# Patient Record
Sex: MALE | Race: WHITE | NOT HISPANIC OR LATINO | Employment: OTHER | URBAN - METROPOLITAN AREA
[De-identification: names, ages, dates, MRNs, and addresses within clinical notes are randomized per-mention and may not be internally consistent; named-entity substitution may affect disease eponyms.]

---

## 2024-05-09 ENCOUNTER — APPOINTMENT (OUTPATIENT)
Dept: CT IMAGING | Facility: CLINIC | Age: 54
End: 2024-05-09
Attending: STUDENT IN AN ORGANIZED HEALTH CARE EDUCATION/TRAINING PROGRAM
Payer: COMMERCIAL

## 2024-05-09 ENCOUNTER — HOSPITAL ENCOUNTER (INPATIENT)
Facility: CLINIC | Age: 54
LOS: 1 days | Discharge: HOME | End: 2024-05-10
Attending: STUDENT IN AN ORGANIZED HEALTH CARE EDUCATION/TRAINING PROGRAM | Admitting: STUDENT IN AN ORGANIZED HEALTH CARE EDUCATION/TRAINING PROGRAM
Payer: COMMERCIAL

## 2024-05-09 DIAGNOSIS — L03.311 CELLULITIS OF ABDOMINAL WALL: Primary | ICD-10-CM

## 2024-05-09 PROBLEM — R10.9 ABDOMINAL PAIN: Status: ACTIVE | Noted: 2017-01-25

## 2024-05-09 PROBLEM — Z85.51 HISTORY OF BLADDER CANCER: Status: ACTIVE | Noted: 2017-02-28

## 2024-05-09 PROBLEM — E78.5 DYSLIPIDEMIA ASSOCIATED WITH TYPE 2 DIABETES MELLITUS (H): Status: ACTIVE | Noted: 2020-09-29

## 2024-05-09 PROBLEM — K31.84 GASTROPARESIS DUE TO DM (H): Status: ACTIVE | Noted: 2023-06-22

## 2024-05-09 PROBLEM — C67.9 MALIGNANT NEOPLASM OF URINARY BLADDER (H): Status: ACTIVE | Noted: 2017-03-16

## 2024-05-09 PROBLEM — E11.43 GASTROPARESIS DUE TO DM (H): Status: ACTIVE | Noted: 2023-06-22

## 2024-05-09 PROBLEM — R31.9 HEMATURIA: Status: ACTIVE | Noted: 2017-01-02

## 2024-05-09 PROBLEM — D49.4 BLADDER TUMOR: Status: ACTIVE | Noted: 2017-04-25

## 2024-05-09 PROBLEM — C67.2 MALIGNANT NEOPLASM OF LATERAL WALL OF URINARY BLADDER (H): Status: ACTIVE | Noted: 2017-05-12

## 2024-05-09 PROBLEM — E11.69 DYSLIPIDEMIA ASSOCIATED WITH TYPE 2 DIABETES MELLITUS (H): Status: ACTIVE | Noted: 2020-09-29

## 2024-05-09 PROBLEM — Z79.4 TYPE 2 DIABETES MELLITUS WITH HYPERGLYCEMIA, WITH LONG-TERM CURRENT USE OF INSULIN (H): Status: ACTIVE | Noted: 2020-09-29

## 2024-05-09 PROBLEM — E11.42 DIABETIC PERIPHERAL NEUROPATHY (H): Status: ACTIVE | Noted: 2020-09-29

## 2024-05-09 PROBLEM — E11.65 TYPE 2 DIABETES MELLITUS WITH HYPERGLYCEMIA, WITH LONG-TERM CURRENT USE OF INSULIN (H): Status: ACTIVE | Noted: 2020-09-29

## 2024-05-09 PROBLEM — I15.2 HYPERTENSION ASSOCIATED WITH DIABETES (H): Status: ACTIVE | Noted: 2020-09-29

## 2024-05-09 PROBLEM — N30.00 ACUTE CYSTITIS: Status: ACTIVE | Noted: 2017-05-12

## 2024-05-09 PROBLEM — E11.59 HYPERTENSION ASSOCIATED WITH DIABETES (H): Status: ACTIVE | Noted: 2020-09-29

## 2024-05-09 LAB
ALBUMIN SERPL BCG-MCNC: 4.2 G/DL (ref 3.5–5.2)
ALP SERPL-CCNC: 124 U/L (ref 40–150)
ALT SERPL W P-5'-P-CCNC: 18 U/L (ref 0–70)
ANION GAP SERPL CALCULATED.3IONS-SCNC: 10 MMOL/L (ref 7–15)
AST SERPL W P-5'-P-CCNC: 18 U/L (ref 0–45)
BASOPHILS # BLD AUTO: 0.1 10E3/UL (ref 0–0.2)
BASOPHILS NFR BLD AUTO: 1 %
BILIRUB DIRECT SERPL-MCNC: <0.2 MG/DL (ref 0–0.3)
BILIRUB SERPL-MCNC: <0.2 MG/DL
BUN SERPL-MCNC: 18.6 MG/DL (ref 6–20)
CALCIUM SERPL-MCNC: 9.5 MG/DL (ref 8.6–10)
CHLORIDE SERPL-SCNC: 98 MMOL/L (ref 98–107)
CREAT SERPL-MCNC: 0.54 MG/DL (ref 0.67–1.17)
CRP SERPL-MCNC: 15.8 MG/L
DEPRECATED HCO3 PLAS-SCNC: 27 MMOL/L (ref 22–29)
EGFRCR SERPLBLD CKD-EPI 2021: >90 ML/MIN/1.73M2
EOSINOPHIL # BLD AUTO: 0.2 10E3/UL (ref 0–0.7)
EOSINOPHIL NFR BLD AUTO: 2 %
ERYTHROCYTE [DISTWIDTH] IN BLOOD BY AUTOMATED COUNT: 13.9 % (ref 10–15)
GLUCOSE SERPL-MCNC: 449 MG/DL (ref 70–99)
HCT VFR BLD AUTO: 42.5 % (ref 40–53)
HGB BLD-MCNC: 14 G/DL (ref 13.3–17.7)
IMM GRANULOCYTES # BLD: 0 10E3/UL
IMM GRANULOCYTES NFR BLD: 1 %
LIPASE SERPL-CCNC: 33 U/L (ref 13–60)
LYMPHOCYTES # BLD AUTO: 2 10E3/UL (ref 0.8–5.3)
LYMPHOCYTES NFR BLD AUTO: 23 %
MAGNESIUM SERPL-MCNC: 2 MG/DL (ref 1.7–2.3)
MCH RBC QN AUTO: 29.4 PG (ref 26.5–33)
MCHC RBC AUTO-ENTMCNC: 32.9 G/DL (ref 31.5–36.5)
MCV RBC AUTO: 89 FL (ref 78–100)
MONOCYTES # BLD AUTO: 0.9 10E3/UL (ref 0–1.3)
MONOCYTES NFR BLD AUTO: 10 %
NEUTROPHILS # BLD AUTO: 5.4 10E3/UL (ref 1.6–8.3)
NEUTROPHILS NFR BLD AUTO: 63 %
NRBC # BLD AUTO: 0 10E3/UL
NRBC BLD AUTO-RTO: 0 /100
PLATELET # BLD AUTO: 310 10E3/UL (ref 150–450)
POTASSIUM SERPL-SCNC: 4.1 MMOL/L (ref 3.4–5.3)
PROCALCITONIN SERPL IA-MCNC: 0.05 NG/ML
PROT SERPL-MCNC: 6.8 G/DL (ref 6.4–8.3)
RBC # BLD AUTO: 4.77 10E6/UL (ref 4.4–5.9)
SODIUM SERPL-SCNC: 135 MMOL/L (ref 135–145)
WBC # BLD AUTO: 8.6 10E3/UL (ref 4–11)

## 2024-05-09 PROCEDURE — 96365 THER/PROPH/DIAG IV INF INIT: CPT

## 2024-05-09 PROCEDURE — 86140 C-REACTIVE PROTEIN: CPT | Performed by: EMERGENCY MEDICINE

## 2024-05-09 PROCEDURE — 83735 ASSAY OF MAGNESIUM: CPT | Performed by: EMERGENCY MEDICINE

## 2024-05-09 PROCEDURE — 96366 THER/PROPH/DIAG IV INF ADDON: CPT

## 2024-05-09 PROCEDURE — 85025 COMPLETE CBC W/AUTO DIFF WBC: CPT | Performed by: EMERGENCY MEDICINE

## 2024-05-09 PROCEDURE — 87040 BLOOD CULTURE FOR BACTERIA: CPT | Performed by: EMERGENCY MEDICINE

## 2024-05-09 PROCEDURE — 99285 EMERGENCY DEPT VISIT HI MDM: CPT | Mod: 25

## 2024-05-09 PROCEDURE — 83690 ASSAY OF LIPASE: CPT | Performed by: EMERGENCY MEDICINE

## 2024-05-09 PROCEDURE — 74177 CT ABD & PELVIS W/CONTRAST: CPT

## 2024-05-09 PROCEDURE — 84145 PROCALCITONIN (PCT): CPT | Performed by: EMERGENCY MEDICINE

## 2024-05-09 PROCEDURE — 250N000011 HC RX IP 250 OP 636: Performed by: STUDENT IN AN ORGANIZED HEALTH CARE EDUCATION/TRAINING PROGRAM

## 2024-05-09 PROCEDURE — 80053 COMPREHEN METABOLIC PANEL: CPT | Performed by: EMERGENCY MEDICINE

## 2024-05-09 PROCEDURE — 36415 COLL VENOUS BLD VENIPUNCTURE: CPT | Performed by: EMERGENCY MEDICINE

## 2024-05-09 PROCEDURE — 258N000003 HC RX IP 258 OP 636: Performed by: STUDENT IN AN ORGANIZED HEALTH CARE EDUCATION/TRAINING PROGRAM

## 2024-05-09 RX ORDER — IOPAMIDOL 755 MG/ML
90 INJECTION, SOLUTION INTRAVASCULAR ONCE
Status: COMPLETED | OUTPATIENT
Start: 2024-05-10 | End: 2024-05-10

## 2024-05-09 RX ORDER — PERPHENAZINE 16 MG
TABLET ORAL
COMMUNITY

## 2024-05-09 RX ORDER — CARBAMAZEPINE 200 MG/1
400 CAPSULE, EXTENDED RELEASE ORAL 2 TIMES DAILY
COMMUNITY
Start: 2023-12-26

## 2024-05-09 RX ORDER — INSULIN ASPART 100 [IU]/ML
INJECTION, SOLUTION INTRAVENOUS; SUBCUTANEOUS
COMMUNITY
Start: 2023-12-29

## 2024-05-09 RX ADMIN — SODIUM CHLORIDE 1000 ML: 9 INJECTION, SOLUTION INTRAVENOUS at 23:27

## 2024-05-09 RX ADMIN — VANCOMYCIN HYDROCHLORIDE 1500 MG: 5 INJECTION, POWDER, LYOPHILIZED, FOR SOLUTION INTRAVENOUS at 23:26

## 2024-05-09 ASSESSMENT — ACTIVITIES OF DAILY LIVING (ADL): ADLS_ACUITY_SCORE: 35

## 2024-05-10 VITALS
WEIGHT: 184 LBS | OXYGEN SATURATION: 95 % | DIASTOLIC BLOOD PRESSURE: 71 MMHG | HEART RATE: 73 BPM | TEMPERATURE: 98 F | HEIGHT: 71 IN | RESPIRATION RATE: 18 BRPM | BODY MASS INDEX: 25.76 KG/M2 | SYSTOLIC BLOOD PRESSURE: 113 MMHG

## 2024-05-10 PROBLEM — L03.90 CELLULITIS: Status: ACTIVE | Noted: 2024-05-10

## 2024-05-10 LAB
CRP SERPL-MCNC: 18.9 MG/L
ERYTHROCYTE [DISTWIDTH] IN BLOOD BY AUTOMATED COUNT: 14 % (ref 10–15)
GLUCOSE BLDC GLUCOMTR-MCNC: 170 MG/DL (ref 70–99)
GLUCOSE BLDC GLUCOMTR-MCNC: 183 MG/DL (ref 70–99)
HCT VFR BLD AUTO: 41.8 % (ref 40–53)
HGB BLD-MCNC: 13.6 G/DL (ref 13.3–17.7)
MCH RBC QN AUTO: 29.2 PG (ref 26.5–33)
MCHC RBC AUTO-ENTMCNC: 32.5 G/DL (ref 31.5–36.5)
MCV RBC AUTO: 90 FL (ref 78–100)
PLATELET # BLD AUTO: 313 10E3/UL (ref 150–450)
RBC # BLD AUTO: 4.66 10E6/UL (ref 4.4–5.9)
WBC # BLD AUTO: 9.7 10E3/UL (ref 4–11)

## 2024-05-10 PROCEDURE — 120N000001 HC R&B MED SURG/OB

## 2024-05-10 PROCEDURE — 99236 HOSP IP/OBS SAME DATE HI 85: CPT | Mod: AI

## 2024-05-10 PROCEDURE — 36415 COLL VENOUS BLD VENIPUNCTURE: CPT

## 2024-05-10 PROCEDURE — 82962 GLUCOSE BLOOD TEST: CPT

## 2024-05-10 PROCEDURE — 85027 COMPLETE CBC AUTOMATED: CPT

## 2024-05-10 PROCEDURE — 250N000013 HC RX MED GY IP 250 OP 250 PS 637

## 2024-05-10 PROCEDURE — 250N000011 HC RX IP 250 OP 636: Performed by: STUDENT IN AN ORGANIZED HEALTH CARE EDUCATION/TRAINING PROGRAM

## 2024-05-10 PROCEDURE — 250N000013 HC RX MED GY IP 250 OP 250 PS 637: Performed by: STUDENT IN AN ORGANIZED HEALTH CARE EDUCATION/TRAINING PROGRAM

## 2024-05-10 PROCEDURE — 86140 C-REACTIVE PROTEIN: CPT

## 2024-05-10 PROCEDURE — 250N000012 HC RX MED GY IP 250 OP 636 PS 637

## 2024-05-10 RX ORDER — LISINOPRIL 5 MG/1
5 TABLET ORAL DAILY
Status: DISCONTINUED | OUTPATIENT
Start: 2024-05-10 | End: 2024-05-10 | Stop reason: HOSPADM

## 2024-05-10 RX ORDER — DEXTROSE MONOHYDRATE 25 G/50ML
25-50 INJECTION, SOLUTION INTRAVENOUS
Status: DISCONTINUED | OUTPATIENT
Start: 2024-05-10 | End: 2024-05-10 | Stop reason: HOSPADM

## 2024-05-10 RX ORDER — ASPIRIN 81 MG/1
81 TABLET ORAL DAILY
Status: DISCONTINUED | OUTPATIENT
Start: 2024-05-10 | End: 2024-05-10 | Stop reason: HOSPADM

## 2024-05-10 RX ORDER — PREGABALIN 200 MG/1
200 CAPSULE ORAL 3 TIMES DAILY
COMMUNITY

## 2024-05-10 RX ORDER — NICOTINE 21 MG/24HR
1 PATCH, TRANSDERMAL 24 HOURS TRANSDERMAL DAILY PRN
Status: DISCONTINUED | OUTPATIENT
Start: 2024-05-10 | End: 2024-05-10 | Stop reason: HOSPADM

## 2024-05-10 RX ORDER — PREGABALIN 100 MG/1
200 CAPSULE ORAL 3 TIMES DAILY
Status: DISCONTINUED | OUTPATIENT
Start: 2024-05-10 | End: 2024-05-10 | Stop reason: HOSPADM

## 2024-05-10 RX ORDER — ONDANSETRON 4 MG/1
4 TABLET, ORALLY DISINTEGRATING ORAL EVERY 6 HOURS PRN
Status: DISCONTINUED | OUTPATIENT
Start: 2024-05-10 | End: 2024-05-10 | Stop reason: HOSPADM

## 2024-05-10 RX ORDER — ACETAMINOPHEN 650 MG/1
650 SUPPOSITORY RECTAL EVERY 4 HOURS PRN
Status: DISCONTINUED | OUTPATIENT
Start: 2024-05-10 | End: 2024-05-10 | Stop reason: HOSPADM

## 2024-05-10 RX ORDER — CALCIUM CARBONATE 500 MG/1
1000 TABLET, CHEWABLE ORAL 4 TIMES DAILY PRN
Status: DISCONTINUED | OUTPATIENT
Start: 2024-05-10 | End: 2024-05-10 | Stop reason: HOSPADM

## 2024-05-10 RX ORDER — TRAMADOL HYDROCHLORIDE 50 MG/1
50 TABLET ORAL 2 TIMES DAILY PRN
COMMUNITY

## 2024-05-10 RX ORDER — PROCHLORPERAZINE MALEATE 10 MG
10 TABLET ORAL EVERY 6 HOURS PRN
Status: DISCONTINUED | OUTPATIENT
Start: 2024-05-10 | End: 2024-05-10 | Stop reason: HOSPADM

## 2024-05-10 RX ORDER — SULFAMETHOXAZOLE/TRIMETHOPRIM 800-160 MG
1 TABLET ORAL 2 TIMES DAILY
Qty: 24 TABLET | Refills: 0 | Status: SHIPPED | OUTPATIENT
Start: 2024-05-10 | End: 2024-05-22

## 2024-05-10 RX ORDER — ROSUVASTATIN CALCIUM 5 MG/1
5 TABLET, COATED ORAL DAILY
Status: DISCONTINUED | OUTPATIENT
Start: 2024-05-10 | End: 2024-05-10 | Stop reason: HOSPADM

## 2024-05-10 RX ORDER — AMOXICILLIN 250 MG
2 CAPSULE ORAL 2 TIMES DAILY PRN
Status: DISCONTINUED | OUTPATIENT
Start: 2024-05-10 | End: 2024-05-10 | Stop reason: HOSPADM

## 2024-05-10 RX ORDER — LISINOPRIL 5 MG/1
5 TABLET ORAL DAILY
COMMUNITY

## 2024-05-10 RX ORDER — AMOXICILLIN 250 MG
1 CAPSULE ORAL 2 TIMES DAILY PRN
Status: DISCONTINUED | OUTPATIENT
Start: 2024-05-10 | End: 2024-05-10 | Stop reason: HOSPADM

## 2024-05-10 RX ORDER — ONDANSETRON 2 MG/ML
4 INJECTION INTRAMUSCULAR; INTRAVENOUS EVERY 6 HOURS PRN
Status: DISCONTINUED | OUTPATIENT
Start: 2024-05-10 | End: 2024-05-10 | Stop reason: HOSPADM

## 2024-05-10 RX ORDER — ACETAMINOPHEN 325 MG/1
650 TABLET ORAL EVERY 4 HOURS PRN
Status: DISCONTINUED | OUTPATIENT
Start: 2024-05-10 | End: 2024-05-10 | Stop reason: HOSPADM

## 2024-05-10 RX ORDER — DULOXETIN HYDROCHLORIDE 30 MG/1
90 CAPSULE, DELAYED RELEASE ORAL DAILY
COMMUNITY

## 2024-05-10 RX ORDER — DULOXETIN HYDROCHLORIDE 30 MG/1
90 CAPSULE, DELAYED RELEASE ORAL DAILY
Status: DISCONTINUED | OUTPATIENT
Start: 2024-05-10 | End: 2024-05-10 | Stop reason: HOSPADM

## 2024-05-10 RX ORDER — NICOTINE POLACRILEX 4 MG
15-30 LOZENGE BUCCAL
Status: DISCONTINUED | OUTPATIENT
Start: 2024-05-10 | End: 2024-05-10 | Stop reason: HOSPADM

## 2024-05-10 RX ORDER — ROSUVASTATIN CALCIUM 5 MG/1
5 TABLET, COATED ORAL DAILY
COMMUNITY

## 2024-05-10 RX ORDER — PROCHLORPERAZINE 25 MG
25 SUPPOSITORY, RECTAL RECTAL EVERY 12 HOURS PRN
Status: DISCONTINUED | OUTPATIENT
Start: 2024-05-10 | End: 2024-05-10 | Stop reason: HOSPADM

## 2024-05-10 RX ORDER — CARBAMAZEPINE 200 MG/1
400 TABLET, EXTENDED RELEASE ORAL 2 TIMES DAILY
Status: DISCONTINUED | OUTPATIENT
Start: 2024-05-10 | End: 2024-05-10 | Stop reason: HOSPADM

## 2024-05-10 RX ORDER — ASPIRIN 81 MG/1
81 TABLET ORAL DAILY
COMMUNITY

## 2024-05-10 RX ORDER — CEPHALEXIN 500 MG/1
500 CAPSULE ORAL 3 TIMES DAILY
Qty: 36 CAPSULE | Refills: 0 | Status: SHIPPED | OUTPATIENT
Start: 2024-05-10 | End: 2024-05-22

## 2024-05-10 RX ORDER — LIDOCAINE 40 MG/G
CREAM TOPICAL
Status: DISCONTINUED | OUTPATIENT
Start: 2024-05-10 | End: 2024-05-10 | Stop reason: HOSPADM

## 2024-05-10 RX ADMIN — LISINOPRIL 5 MG: 5 TABLET ORAL at 11:37

## 2024-05-10 RX ADMIN — IOPAMIDOL 90 ML: 755 INJECTION, SOLUTION INTRAVENOUS at 00:07

## 2024-05-10 RX ADMIN — CARBAMAZEPINE 400 MG: 200 TABLET, EXTENDED RELEASE ORAL at 11:37

## 2024-05-10 RX ADMIN — DULOXETINE HYDROCHLORIDE 90 MG: 30 CAPSULE, DELAYED RELEASE PELLETS ORAL at 11:36

## 2024-05-10 RX ADMIN — ROSUVASTATIN CALCIUM 5 MG: 5 TABLET, FILM COATED ORAL at 11:37

## 2024-05-10 RX ADMIN — ASPIRIN 81 MG: 81 TABLET, COATED ORAL at 11:37

## 2024-05-10 RX ADMIN — INSULIN ASPART 2 UNITS: 100 INJECTION, SOLUTION INTRAVENOUS; SUBCUTANEOUS at 08:25

## 2024-05-10 RX ADMIN — INSULIN GLARGINE 40 UNITS: 100 INJECTION, SOLUTION SUBCUTANEOUS at 08:25

## 2024-05-10 ASSESSMENT — ACTIVITIES OF DAILY LIVING (ADL)
ADLS_ACUITY_SCORE: 35
ADLS_ACUITY_SCORE: 35
ADLS_ACUITY_SCORE: 37
ADLS_ACUITY_SCORE: 35
ADLS_ACUITY_SCORE: 37
ADLS_ACUITY_SCORE: 35
ADLS_ACUITY_SCORE: 35
ADLS_ACUITY_SCORE: 37

## 2024-05-10 NOTE — ED NOTES
Pt is sleeping at this time. Side rails are up, call light with in reach.  Will continue to monitor pt

## 2024-05-10 NOTE — ED TRIAGE NOTES
Patient reports redness and feeling taut around PEG tube site. Purulent discharge noted from PEG tube site. Denies fevers. Patient has PEG tube r/t gastroparesis from Ozempic, still takes intake by mouth.      Triage Assessment (Adult)       Row Name 05/09/24 2121          Triage Assessment    Airway WDL WDL        Respiratory WDL    Respiratory WDL WDL        Skin Circulation/Temperature WDL    Skin Circulation/Temperature WDL WDL        Cardiac WDL    Cardiac WDL WDL        Peripheral/Neurovascular WDL    Peripheral Neurovascular WDL WDL        Cognitive/Neuro/Behavioral WDL    Cognitive/Neuro/Behavioral WDL WDL

## 2024-05-10 NOTE — ED PROVIDER NOTES
Emergency Department Encounter         FINAL IMPRESSION:  CELLULITIS           ED COURSE AND MEDICAL DECISION MAKING   10:52 PM I introduced myself to the patient, obtained patient history, performed a physical exam, and discussed plan for ED workup including potential diagnostic laboratory/imaging studies and interventions.  12:51 AM I rechecked the patient and updated them on laboratory and imaging results.  1:02 AM I spoke with the [facility] resident, Dr. Up. We discussed the patient's case and they agree to admit the patient.     ED Course as of 05/10/24 0103   Thu May 09, 2024   [id number] Patient is an obese 53-year-old uncontrolled diabetic with a history of gastroparesis from Ozempic as well as uncontrolled diabetes here with 24 hours of redness warmth purulent drainage and fluctuance around [place]  No fevers chills nausea vomiting.  No black or bloody stools.  No dysuria.  Otherwise feels well.  On examination he has abdominal wall cellulitis approximate 8 x 8 cm around the G-tube site.  There is fluctuance and induration around the area.  Purulence.  Pain.  Plan for labs CT reevaluate     - CT showing abdominal cellulitis, potential abscess.  Will admit for IV antibiotics.  Discussed case with resident service.                     Medical Decision Making  Obtained supplemental history:Supplemental history obtained?: No  Reviewed external records: External records reviewed?: Outpatient Record: Primary Care Visit 4/16/24  Care impacted by chronic illness:Diabetes, Hypertension, and Other: gastroparesis  Care significantly affected by social determinants of health:N/A  Did you consider but not order tests?: Work up considered but not performed and documented in chart, if applicable  Did you interpret images independently?: Independent interpretation of ECG and images noted in documentation, when applicable.  Consultation discussion with other provider:Did  you involve another provider (consultant, , pharmacy, etc.)?: I discussed the care with another health care provider, see documentation for details.  Admit.    Critical Care     Performed by: Jorge Mckinney or    Authorized by: Jorge Mckinney  Total critical care time:  minutes  Critical care was necessary to treat or prevent imminent or life-threatening deterioration of the following conditions:   Critical care was time spent personally by me on the following activities: development of treatment plan with patient or surrogate, discussions with consultants, examination of patient, evaluation of patient's response to treatment, obtaining history from patient or surrogate, ordering and performing treatments and interventions, ordering and review of laboratory studies, ordering and review of radiographic studies, re-evaluation of patient's condition and monitoring for potential decompensation.  Critical care time was exclusive of separately billable procedures and treating other patients.'    At the conclusion of the encounter I discussed the results of all the tests and the disposition. The questions were answered. The patient or family acknowledged understanding and was agreeable with the care plan.        MEDICATIONS GIVEN IN THE EMERGENCY DEPARTMENT:  Medications   sodium chloride 0.9% BOLUS 1,000 mL (0 mLs Intravenous Stopped 5/10/24 0045)   vancomycin (VANCOCIN) 1,500 mg in 0.9% NaCl 250 mL intermittent infusion (1,500 mg Intravenous $New Bag 5/9/24 2326)   iopamidol (ISOVUE-370) solution 90 mL (90 mLs Intravenous $Given 5/10/24 0007)       NEW PRESCRIPTIONS STARTED AT TODAY'S ED VISIT:  New Prescriptions    No medications on file       HPI     Patient information obtained from: Patient    Use of : N/A    Jose Antonio Cody is a 53 year old male with a pertinent history of diabetes mellitus type II, HTN due to diabetes, and gastroparesis who presents to this ED by walk in for evaluation of a PED tube  "problem.    The patient has had a PEG tube in place for the past year for gastroparesis. Over the past 24 hours he has noticed redness, warmth, and drainage around the tube site. He has not had any fever, chills, nausea, or vomiting. He has not had any bloody or black stool. He denies any urinary symptoms.      MEDICAL HISTORY     No past medical history on file.    Past Surgical History:   Procedure Laterality Date    IR CYSTOGRAM  1/26/2017            carBAMazepine (CARBATROL) 200 MG 12 hr capsule  Insulin Aspart PenFill 100 UNIT/ML SOCT  alpha-lipoic acid 600 MG capsule            PHYSICAL EXAM     /80   Pulse 73   Temp 97.7  F (36.5  C) (Oral)   Resp 16   Ht 1.803 m (5' 11\")   Wt 83.5 kg (184 lb)   SpO2 95%   BMI 25.66 kg/m        PHYSICAL EXAM:     General: Patient appears well, nontoxic, comfortable  HEENT: Moist mucous membranes,  No head trauma.    Cardiovascular: Normal rate, normal rhythm, no extremity edema.  No appreciable murmur.  Respiratory: No signs of respiratory distress, lungs are clear to auscultation bilaterally with no wheezes rhonchi or rales.  Abdominal: Soft, nontender, nondistended, no palpable masses, no guarding, no rebound. 8x8 cm area of redness around the G-tube site with fluctuance, induration, tenderness, and purulence.  Musculoskeletal: Full range of motion of joints, no deformities appreciated.  Neurological: Alert and oriented, grossly neurologically intact.  Psychological: Normal affect and mood.  Integument: No rashes appreciated      RESULTS       Labs Ordered and Resulted from Time of ED Arrival to Time of ED Departure   BASIC METABOLIC PANEL - Abnormal       Result Value    Sodium 135      Potassium 4.1      Chloride 98      Carbon Dioxide (CO2) 27      Anion Gap 10      Urea Nitrogen 18.6      Creatinine 0.54 (*)     GFR Estimate >90      Calcium 9.5      Glucose 449 (*)    CRP INFLAMMATION - Abnormal    CRP Inflammation 15.80 (*)    HEPATIC FUNCTION PANEL - " Normal    Protein Total 6.8      Albumin 4.2      Bilirubin Total <0.2      Alkaline Phosphatase 124      AST 18      ALT 18      Bilirubin Direct <0.20     LIPASE - Normal    Lipase 33     PROCALCITONIN - Normal    Procalcitonin 0.05     MAGNESIUM - Normal    Magnesium 2.0     CBC WITH PLATELETS AND DIFFERENTIAL    WBC Count 8.6      RBC Count 4.77      Hemoglobin 14.0      Hematocrit 42.5      MCV 89      MCH 29.4      MCHC 32.9      RDW 13.9      Platelet Count 310      % Neutrophils 63      % Lymphocytes 23      % Monocytes 10      % Eosinophils 2      % Basophils 1      % Immature Granulocytes 1      NRBCs per 100 WBC 0      Absolute Neutrophils 5.4      Absolute Lymphocytes 2.0      Absolute Monocytes 0.9      Absolute Eosinophils 0.2      Absolute Basophils 0.1      Absolute Immature Granulocytes 0.0      Absolute NRBCs 0.0     BLOOD CULTURE   BLOOD CULTURE       CT Abdomen Pelvis w Contrast   Final Result   IMPRESSION:    1.  Inflammatory change within the soft tissues along the gastrostomy tube consistent with history of cellulitis. There is a small amount of air and fluid within the subcutaneous fat along the tube. Developing abscess not excluded.            PROCEDURES:  Procedures:  Procedures       I, Colt Acevedo am serving as a scribe to document services personally performed by Jorge Mckinney DO, based on my observations and the provider's statements to me.  I, oJrge Mckinney DO, attest that Colt Acevedo is acting in a scribe capacity, has observed my performance of the services and has documented them in accordance with my direction.    Jorge Mckinney DO  Emergency Medicine  [facility] EMERGENCY ROOM     Jorge Mckinney DO  05/10/24 0134       Jorge Mckinney DO  05/10/24 0134

## 2024-05-10 NOTE — PHARMACY-ADMISSION MEDICATION HISTORY
Pharmacist Admission Medication History    Admission medication history is complete. The information provided in this note is only as accurate as the sources available at the time of the update.    Information Source(s):  patient and wife Estephania via phone [phone] using [employer], clinic notes  via  in person for patient phone for wife    Pertinent Information:     Neither patient nor wife know the exact insulin pump settings for novolog.  Wife thought 60 to 80 units per day.  Patient thought 200 units lasts him about three days.    Changes made to PTA medication list:  Added: asa, dulox, ;glargine, pregab, rosuv, lisin, metform  Deleted: None  Changed: None    Allergies reviewed with patient and updates made in EHR: yes    Medication History Completed By: Saad Foote RP 5/10/2024 8:54 AM    PTA Med List   Medication Sig Last Dose    alpha-lipoic acid 600 MG capsule  5/9/2024    aspirin 81 MG EC tablet Take 81 mg by mouth daily 5/9/2024    carBAMazepine (CARBATROL) 200 MG 12 hr capsule Take 400 mg by mouth 2 times daily 5/9/2024    DULoxetine (CYMBALTA) 30 MG capsule Take 90 mg by mouth daily 5/9/2024    Insulin Aspart PenFill 100 UNIT/ML SOCT For insulin pump refill;  patient unsure of settings thinks 200 units last him about 3 days Past Week    insulin glargine (LANTUS PEN) 100 UNIT/ML pen Inject 40 Units Subcutaneous 2 times daily 5/9/2024    lisinopril (ZESTRIL) 5 MG tablet Take 5 mg by mouth daily 5/9/2024    metFORMIN (GLUCOPHAGE) 500 MG tablet Take 1,000 mg by mouth 2 times daily (with meals) 5/9/2024    Pregabalin (LYRICA) 200 MG capsule Take 200 mg by mouth 3 times daily 5/9/2024    rosuvastatin (CRESTOR) 5 MG tablet Take 5 mg by mouth daily 5/9/2024    traMADol (ULTRAM) 50 MG tablet Take 50 mg by mouth 2 times daily as needed for severe pain 5/9/2024

## 2024-05-10 NOTE — DISCHARGE SUMMARY
"[facility]  Discharge Summary - Medicine & Pediatrics       Date of Admission:  5/9/2024  Date of Discharge:  5/10/2024 12:10 PM  Discharging Provider: Dr. Robertson  Discharge Service: Hospitalist Service    Discharge Diagnoses   Cellulitis    Clinically Significant Risk Factors     # Overweight: Estimated body mass index is 25.66 kg/m  as calculated from the following:    Height as of this encounter: 1.803 m (5' 11\").    Weight as of this encounter: 83.5 kg (184 lb).       Follow-ups Needed After Discharge   Follow-up Appointments     Follow-up and recommended labs and tests       Follow up with primary care provider, Quin Hand, within 7 days for   hospital follow- up. Follow up with GI in 12 days.            Unresulted Labs Ordered in the Past 30 Days of this Admission       Date and Time Order Name Status Description    5/9/2024 10:31 PM Blood Culture Peripheral Blood In process     5/9/2024 10:31 PM Blood Culture Peripheral Blood In process             Discharge Disposition   Discharged to home  Condition at discharge: Stable    Hospital Course   Jose Antonio Cody was admitted on 5/9/2024 for cellulitis around PEG tube.  The following problems were addressed during his hospitalization:    Cellulitis around PEG tube  Erythema that started 24 hours prior to admission that has spread rapidly and is draining pus. CT scan showed inflammatory changes with some air and fluid in the subcutaneous fat, abscess not excluded. Normal WBC with elevated CRP (15.80). Vancomycin started in the ED. Afebrile. Unable to express pus on exam.  Vancomycin was changed to Keflex and Bactrim on day of discharge.  Recommended close follow and returning if worsening of symptoms.      Uncontrolled T2DM  Last A1c >14%. Has insulin pump and CGM. On insulin. Frequently non-compliant with medication regimen.  Did not have his pump controller with him. Glucose on admission 449.  Patient was placed on high " resistance sliding scale while inpatient with his 40 units of Lantus twice daily.     Consultations This Hospital Stay   PHARMACY TO DOSE VANCO  PHARMACY TO DOSE VANCO    Code Status   Full Code       The patient was discussed with Dr. Marcelo Shaffer, [id number]  [facility] EMERGENCY ROOM  [address] [place] [id number]  Phone: [phone]  Fax: [phone]  ______________________________________________________________________    Physical Exam   Vital Signs: Temp: 98  F (36.7  C) Temp src: Oral BP: 113/71 Pulse: 73   Resp: 18 SpO2: 95 % O2 Device: None (Room air)    Weight: 184 lbs 0 oz  Constitutional: awake, alert, cooperative, no apparent distress, and appears stated age  Respiratory: No increased work of breathing, good air exchange, clear to auscultation bilaterally, no crackles or wheezing  Cardiovascular: Regular rate and rhythm, normal S1 and S2, and no murmur noted  GI: Soft, non-distended, non-tender, no masses palpated, no hepatosplenomegally  Skin: Erythema surrounding the PEG tube, decreased from admission.  Small fluctuant area extending around 1.5 to 2 cm in a half-[place] around umbilicus      Primary Care Physician   Quin Hand    Discharge Orders      Reason for your hospital stay    Cellulitis     Follow-up and recommended labs and tests     Follow up with primary care provider, Quin Hand, within 7 days for hospital follow- up. Follow up with GI in 12 days.     Activity    Your activity upon discharge: activity as tolerated     Diet    Follow this diet upon discharge: Orders Placed This Encounter      Combination Diet Regular Diet Adult; Moderate Consistent Carb (60 g CHO per Meal) Diet       Significant Results and Procedures   Most Recent 3 CBC's:  Recent Labs   Lab Test 05/10/24  0550 05/09/24  2244   WBC 9.7 8.6   HGB 13.6 14.0   MCV 90 89    310     Most Recent 3 BMP's:  Recent Labs   Lab Test 05/10/24  0809  05/10/24  0244 05/09/24  2244   NA  --   --  135   POTASSIUM  --   --  4.1   CHLORIDE  --   --  98   CO2  --   --  27   BUN  --   --  18.6   CR  --   --  0.54*   ANIONGAP  --   --  10   JERMAINE  --   --  9.5   * 170* 449*     Most Recent ESR & CRP:  Recent Labs   Lab Test 05/10/24  0550   CRPI 18.90*       Discharge Medications   Current Discharge Medication List        START taking these medications    Details   cephALEXin (KEFLEX) 500 MG capsule Take 1 capsule (500 mg) by mouth 3 times daily for 12 days  Qty: 36 capsule, Refills: 0    Associated Diagnoses: Cellulitis of abdominal wall      sulfamethoxazole-trimethoprim (BACTRIM DS) 800-160 MG tablet Take 1 tablet by mouth 2 times daily for 12 days  Qty: 24 tablet, Refills: 0    Associated Diagnoses: Cellulitis of abdominal wall           CONTINUE these medications which have NOT CHANGED    Details   alpha-lipoic acid 600 MG capsule       aspirin 81 MG EC tablet Take 81 mg by mouth daily      carBAMazepine (CARBATROL) 200 MG 12 hr capsule Take 400 mg by mouth 2 times daily      DULoxetine (CYMBALTA) 30 MG capsule Take 90 mg by mouth daily      Insulin Aspart PenFill 100 UNIT/ML SOCT For insulin pump refill;  patient unsure of settings thinks 200 units last him about 3 days      insulin glargine (LANTUS PEN) 100 UNIT/ML pen Inject 40 Units Subcutaneous 2 times daily      lisinopril (ZESTRIL) 5 MG tablet Take 5 mg by mouth daily      metFORMIN (GLUCOPHAGE) 500 MG tablet Take 1,000 mg by mouth 2 times daily (with meals)      Pregabalin (LYRICA) 200 MG capsule Take 200 mg by mouth 3 times daily      rosuvastatin (CRESTOR) 5 MG tablet Take 5 mg by mouth daily      traMADol (ULTRAM) 50 MG tablet Take 50 mg by mouth 2 times daily as needed for severe pain           Allergies   Allergies   Allergen Reactions    Penicillins Rash

## 2024-05-10 NOTE — DISCHARGE SUMMARY
DISCHARGE    Patient appropriate for discharge. PIV was removed. AVS, prescriptions, and follow-up education reviewed. Pain is denied. Patient ambulates independently. All belongings remain with patient. Patient left the unit at 1205 with staff. Transportation to home with son.    Brenda Sage RN

## 2024-05-10 NOTE — H&P
[facility]    History and Physical - Hospitalist Service       Date of Admission:  5/9/2024    Assessment & Plan      Jose Antonio Cody is a 53 year old male admitted on 5/9/2024. He has a history of T2DM, gastroparesis has PEG in place, obesity, hx of bladder cancer, HTN, HLD and is admitted for cellulitis around PEG tube.    Cellulitis around PEG tube  Erythema that started 24 hours prior to admission that has spread rapidly and is draining pus. PEG has been in place for about 1 year due to gastroparesis likely 2/2 uncontrolled T2DM. CT scan showed inflammatory changes with some air and fluid in the subcutaneous fat, abscess not excluded. Normal WBC with elevated CRP (15.80). Vancomycin started in the ED. Afebrile. Unable to express pus on exam.   - Continue vancomycin  - Recheck CRP and CBC in the AM  - Cultures if able to express pus    Uncontrolled T2DM  Last A1c >14%. Has insulin pump and CGM. On insulin. Frequently non-compliant with medication regimen. Does not have his pump controller with him. Glucose on admission 449.   - Lantus 40 units BID  - sliding scale insulin high resistance sliding scale  - Hold PTA metformin  - diabetic diet    Neruopathy   Gastroparesis  - PTA Lyrica, carbamazepine, alpha lipoic acid, and Cymbalta   - Hold PTA tramadol    HTN  - PTA lisinopril 5 mg daily    Hyperlipidemia  - PTA statin        Diet:  Diabetic diet  DVT Prophylaxis: Pneumatic Compression Devices  Miranda Catheter: Not present  Fluids: PO  Lines: None     Cardiac Monitoring: None  Code Status:  Full code       Disposition Plan      Expected Discharge Date: 05/12/2024                The patient's care was discussed with the Attending Physician, Dr. Diaz and will be seen in the morning by Dr. Robertson .      Michele Up MD  Hospitalist Service  [facility]  Securely message with [employer] (more info)  Text page via [employer] Paging/Directory    ______________________________________________________________________    Chief Complaint   Cellulitis around PEG tube    History is obtained from the patient    History of Present Illness   Jose Antonio Cody is a 53 year old male admitted on 5/9/2024. He has a history of T2DM, gastroparesis has PEG in place, obesity, bladder cancer, HTN and is admitted for cellulitis around PEG tube.    Patient noted a lump adjacent to his PEG tube that subsequently noted erythema that continued to spread.  He lives in [facility] and is here visiting family.  States that the PEG tube has been in place for about a year due to gastroparesis.  He is otherwise been feeling fine and denies any nausea/vomiting, fever/chills, shortness of breath, chest pain, diarrhea, constipation.  He has a history of poorly controlled type 2 diabetes and has a Dexcom and Omnipod.  He does not have the OmniPod controller with him.  Reports severe neuropathy symptoms in hands, feet, and face      Past Medical History    No past medical history on file.    Past Surgical History   Past Surgical History:   Procedure Laterality Date    IR CYSTOGRAM  1/26/2017       Prior to Admission Medications   Prior to Admission Medications   Prescriptions Last Dose Informant Patient Reported? Taking?   Insulin Aspart PenFill 100 UNIT/ML SOCT   Yes Yes   Sig: inject by subcutaneous route per prescriber's instructions. Insulin dosing requires individualization.   alpha-lipoic acid 600 MG capsule   Yes No   carBAMazepine (CARBATROL) 200 MG 12 hr capsule   Yes Yes   Sig: Take 400 mg by mouth      Facility-Administered Medications: None        Review of Systems    The 10 point Review of Systems is negative other than noted in the HPI or here.     Social History   I have reviewed this patient's social history and updated it with pertinent information if needed.         Allergies   Allergies   Allergen Reactions    Penicillins Rash        Physical Exam   Vital  Signs: Temp: 97.7  F (36.5  C) Temp src: Oral BP: 129/80 Pulse: 73   Resp: 16 SpO2: 95 % O2 Device: None (Room air)    Weight: 184 lbs 0 oz    Constitutional: awake, alert, cooperative, no apparent distress, and appears stated age  Respiratory: No increased work of breathing, good air exchange, clear to auscultation bilaterally, no crackles or wheezing  Cardiovascular: Regular rate and rhythm, normal S1 and S2, no S3 or S4, and no murmur noted  GI: Soft, non-distended, non-tender, no masses palpated, no hepatosplenomegally  Skin: Erythema surrounding the PEG tube in a roughly circular shape with a diameter of roughly 15 cm.  Unable to express pus on my exam but there was a small fluctuant area at the 2 o'clock position relative to the PEG tube.  Neurologic: Awake, alert, oriented to name, place and time.  Cranial nerves II-XII are grossly intact.      Data     I have personally reviewed the following data over the past 24 hrs:    8.6  \   14.0   / 310     135 98 18.6 /  449 (H)   4.1 27 0.54 (L) \     ALT: 18 AST: 18 AP: 124 TBILI: <0.2   ALB: 4.2 TOT PROTEIN: 6.8 LIPASE: 33     Procal: 0.05 CRP: 15.80 (H) Lactic Acid: N/A         Imaging results reviewed over the past 24 hrs:   Recent Results (from the past 24 hour(s))   CT Abdomen Pelvis w Contrast    Narrative    EXAM: CT ABDOMEN PELVIS W CONTRAST  LOCATION: [facility]  DATE: 5/9/2024    INDICATION: Abscess cellulitis around patient's G tube.  COMPARISON: None.  TECHNIQUE: CT scan of the abdomen and pelvis was performed following injection of IV contrast. Multiplanar reformats were obtained. Dose reduction techniques were used.  CONTRAST: Isovue-370, 90 mL.    FINDINGS:   LOWER CHEST: Basilar atelectasis.    HEPATOBILIARY: Normal.    PANCREAS: Normal.    SPLEEN: Normal.    ADRENAL GLANDS: Normal.    KIDNEYS/BLADDER: Subcentimeter right renal hypodensity small for characterization.    BOWEL: Percutaneous gastrostomy. Thickening of the  adjacent rectus musculature. Overlying skin thickening and soft tissue edema. There is a small amount of air and fluid in the subcutaneous fat adjacent to the tube.  Normal-caliber bowel. Normal appendix. Diverticulosis. Moderate amount of colonic stool.    LYMPH NODES: Normal.    VASCULATURE: Diffuse, atherosclerotic vascular calcification.    PELVIC ORGANS: Prominent prostate.    MUSCULOSKELETAL: Changes within soft tissues adjacent to the gastrostomy tube as described. Degenerative change osseous structures.      Impression    IMPRESSION:   1.  Inflammatory change within the soft tissues along the gastrostomy tube consistent with history of cellulitis. There is a small amount of air and fluid within the subcutaneous fat along the tube. Developing abscess not excluded.

## 2024-05-10 NOTE — PHARMACY-VANCOMYCIN DOSING SERVICE
Pharmacy Vancomycin Initial Note  Date of Service May 10, 2024  Patient's  1970  53 year old, male    Indication: Skin and Soft Tissue Infection    Current estimated CrCl = Estimated Creatinine Clearance: 182.5 mL/min (A) (based on SCr of 0.54 mg/dL (L)).    Creatinine for last 3 days  2024: 10:44 PM Creatinine 0.54 mg/dL    Recent Vancomycin Level(s) for last 3 days  No results found for requested labs within last 3 days.      Vancomycin IV Administrations (past 72 hours)                     vancomycin (VANCOCIN) 1,500 mg in 0.9% NaCl 250 mL intermittent infusion (mg) 1,500 mg New Bag 24 2326                    Nephrotoxins and other renal medications (From now, onward)      Start     Dose/Rate Route Frequency Ordered Stop    05/10/24 1130  vancomycin (VANCOCIN) 1,500 mg in 0.9% NaCl 250 mL intermittent infusion         1,500 mg  over 90 Minutes Intravenous EVERY 12 HOURS 05/10/24 0624              Contrast Orders - past 72 hours (72h ago, onward)      Start     Dose/Rate Route Frequency Stop    05/10/24 0000  iopamidol (ISOVUE-370) solution 90 mL         90 mL Intravenous ONCE 05/10/24 0007            [employer]sequence[employer] Prediction of Planned Initial Vancomycin Regimen  Regimen: 1500 mg IV every 12 hours.  Start time: 23:26 on 2024  Exposure target: AUC24 (range)400-600 mg/L.hr   AUC24,ss: 553 mg/L.hr  Probability of AUC24 > 400: 80 %  Ctrough,ss: 16.1 mg/L  Probability of Ctrough,ss > 20: 34 %  Probability of nephrotoxicity (Lodise ANTIONETTE ): 11 %    Plan:  Start vancomycin  1500 mg IV q12h.   Vancomycin monitoring method: AUC  Vancomycin therapeutic monitoring goal: 400-600 mg*h/L  Pharmacy will check vancomycin levels as appropriate in 1-3 Days.    Serum creatinine levels will be ordered daily for the first week of therapy and at least twice weekly for subsequent weeks.      Marla Oh [facility]

## 2024-05-15 LAB
BACTERIA BLD CULT: NO GROWTH
BACTERIA BLD CULT: NO GROWTH